# Patient Record
(demographics unavailable — no encounter records)

---

## 2025-06-02 NOTE — PLAN
[FreeTextEntry1] : 35 year old female for annual visit.   Plan:  - Pap smear today - Referred to orthopedics

## 2025-06-02 NOTE — END OF VISIT
[FreeTextEntry3] : I, Suni Wise acted as a scribe on behalf of Dr. Genie Jenkins D.O. on 06/02/2025.   All medical entries made by the scribe were at my, Dr. Genie Jenkins D.O., direction and personally dictated by me on 06/02/2025. I have reviewed the chart and agree that the record accurately reflects my personal performance of the history, physical exam, assessment and plan. I have also personally directed, reviewed, and agreed with the chart.

## 2025-06-02 NOTE — HISTORY OF PRESENT ILLNESS
[FreeTextEntry1] : 35 year old female presents for annual visit. Pt had  10/01/24. Pt c/o continued pain at epidural site.   GYNHx: fibroid OBHx:  ().  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